# Patient Record
Sex: FEMALE | Race: WHITE | Employment: STUDENT | URBAN - METROPOLITAN AREA
[De-identification: names, ages, dates, MRNs, and addresses within clinical notes are randomized per-mention and may not be internally consistent; named-entity substitution may affect disease eponyms.]

---

## 2024-07-16 ENCOUNTER — OFFICE VISIT (OUTPATIENT)
Age: 19
End: 2024-07-16

## 2024-07-16 VITALS
HEART RATE: 106 BPM | HEIGHT: 65 IN | RESPIRATION RATE: 15 BRPM | SYSTOLIC BLOOD PRESSURE: 122 MMHG | WEIGHT: 190.8 LBS | OXYGEN SATURATION: 98 % | DIASTOLIC BLOOD PRESSURE: 72 MMHG | BODY MASS INDEX: 31.79 KG/M2

## 2024-07-16 DIAGNOSIS — Z11.4 SCREENING FOR HIV (HUMAN IMMUNODEFICIENCY VIRUS): ICD-10-CM

## 2024-07-16 DIAGNOSIS — L70.0 ACNE VULGARIS: ICD-10-CM

## 2024-07-16 DIAGNOSIS — Z13.6 ENCOUNTER FOR LIPID SCREENING FOR CARDIOVASCULAR DISEASE: ICD-10-CM

## 2024-07-16 DIAGNOSIS — Z11.59 NEED FOR HEPATITIS C SCREENING TEST: ICD-10-CM

## 2024-07-16 DIAGNOSIS — J30.9 ALLERGIC RHINITIS, UNSPECIFIED SEASONALITY, UNSPECIFIED TRIGGER: ICD-10-CM

## 2024-07-16 DIAGNOSIS — Z00.00 WELL ADULT EXAM: Primary | ICD-10-CM

## 2024-07-16 DIAGNOSIS — Z13.220 ENCOUNTER FOR LIPID SCREENING FOR CARDIOVASCULAR DISEASE: ICD-10-CM

## 2024-07-16 PROCEDURE — 99203 OFFICE O/P NEW LOW 30 MIN: CPT | Performed by: FAMILY MEDICINE

## 2024-07-16 PROCEDURE — 99395 PREV VISIT EST AGE 18-39: CPT | Performed by: FAMILY MEDICINE

## 2024-07-16 RX ORDER — FLUTICASONE PROPIONATE 50 MCG
1 SPRAY, SUSPENSION (ML) NASAL DAILY
Qty: 9.9 ML | Refills: 1 | Status: SHIPPED | OUTPATIENT
Start: 2024-07-16

## 2024-07-16 NOTE — PROGRESS NOTES
Adult Annual Physical  Name: Joann Padgett      : 2005      MRN: [id number]  Encounter Provider: INGRID Valverde  Encounter Date: 2024   Encounter department: [facility] PRIMARY CARE    Assessment & Plan   1. Well adult exam  2. Encounter for lipid screening for cardiovascular disease  3. Allergic rhinitis, unspecified seasonality, unspecified trigger  Assessment & Plan:  Flonase ordered  4. Acne vulgaris  Assessment & Plan:  Patient sees [facility]    Immunizations and preventive care screenings were discussed with patient today. Appropriate education was printed on patient's after visit summary.    Counseling:  Exercise: the importance of regular exercise/physical activity was discussed. Recommend exercise 3-5 times per week for at least 30 minutes.          History of Present Illness     Adult Annual Physical:  Patient presents for annual physical.     Diet and Physical Activity:  - Diet/Nutrition: well balanced diet.  - Exercise: walking.    Depression Screening:  - PHQ-2 Score: 0    General Health:  - Sleep: sleeps well.  - Hearing: normal hearing bilateral ears.  - Vision: wears glasses.  - Dental: regular dental visits.    /GYN Health:    - Last menstrual cycle: 2024.     Review of Systems   Constitutional:  Negative for chills and fever.   HENT:  Negative for ear pain and sore throat.    Eyes:  Negative for pain and visual disturbance.   Respiratory:  Negative for cough and shortness of breath.    Cardiovascular:  Negative for chest pain and palpitations.   Gastrointestinal:  Negative for abdominal pain and vomiting.   Genitourinary:  Negative for dysuria and hematuria.   Musculoskeletal:  Negative for arthralgias and back pain.   Skin:  Negative for color change and rash.   Neurological:  Negative for seizures and syncope.   All other systems reviewed and are negative.        Objective     /72 (BP Location: Left arm, Patient Position: Sitting, Cuff Size:  "Standard)   Pulse (!) 106   Resp 15   Ht 5' 5\" (1.651 m)   Wt 86.5 kg (190 lb 12.8 oz)   SpO2 98%   BMI 31.75 kg/m²     Physical Exam  Vitals and nursing note reviewed.   Constitutional:       General: She is not in acute distress.     Appearance: She is well-developed.   HENT:      Head: Normocephalic and atraumatic.      Nose:      Right Turbinates: Swollen.      Left Turbinates: Pale.      Mouth/Throat:      Pharynx: Posterior oropharyngeal erythema and postnasal drip present.   Eyes:      Conjunctiva/sclera: Conjunctivae normal.   Cardiovascular:      Rate and Rhythm: Normal rate and regular rhythm.      Heart sounds: No murmur heard.  Pulmonary:      Effort: Pulmonary effort is normal. No respiratory distress.      Breath sounds: Normal breath sounds.   Abdominal:      Palpations: Abdomen is soft.      Tenderness: There is no abdominal tenderness.   Musculoskeletal:         General: No swelling.      Cervical back: Neck supple.   Skin:     General: Skin is warm and dry.      Capillary Refill: Capillary refill takes less than 2 seconds.      Comments: Acne to face noted   Neurological:      Mental Status: She is alert.   Psychiatric:         Mood and Affect: Mood normal.         "

## 2024-08-13 ENCOUNTER — APPOINTMENT (OUTPATIENT)
Age: 19
End: 2024-08-13
Payer: COMMERCIAL

## 2024-08-13 DIAGNOSIS — Z13.220 ENCOUNTER FOR LIPID SCREENING FOR CARDIOVASCULAR DISEASE: ICD-10-CM

## 2024-08-13 DIAGNOSIS — L70.0 ACNE VULGARIS: ICD-10-CM

## 2024-08-13 DIAGNOSIS — Z00.00 WELL ADULT EXAM: ICD-10-CM

## 2024-08-13 DIAGNOSIS — Z11.4 SCREENING FOR HIV (HUMAN IMMUNODEFICIENCY VIRUS): ICD-10-CM

## 2024-08-13 DIAGNOSIS — Z13.6 ENCOUNTER FOR LIPID SCREENING FOR CARDIOVASCULAR DISEASE: ICD-10-CM

## 2024-08-13 LAB
ALBUMIN SERPL BCG-MCNC: 4.5 G/DL (ref 3.5–5)
ALP SERPL-CCNC: 76 U/L (ref 34–104)
ALT SERPL W P-5'-P-CCNC: 8 U/L (ref 7–52)
ANION GAP SERPL CALCULATED.3IONS-SCNC: 11 MMOL/L (ref 4–13)
AST SERPL W P-5'-P-CCNC: 14 U/L (ref 13–39)
BASOPHILS # BLD AUTO: 0.04 THOUSANDS/ÂΜL (ref 0–0.1)
BASOPHILS NFR BLD AUTO: 1 % (ref 0–1)
BILIRUB SERPL-MCNC: 0.65 MG/DL (ref 0.2–1)
BUN SERPL-MCNC: 11 MG/DL (ref 5–25)
CALCIUM SERPL-MCNC: 9.3 MG/DL (ref 8.4–10.2)
CHLORIDE SERPL-SCNC: 103 MMOL/L (ref 96–108)
CHOLEST SERPL-MCNC: 171 MG/DL
CO2 SERPL-SCNC: 27 MMOL/L (ref 21–32)
CREAT SERPL-MCNC: 0.72 MG/DL (ref 0.6–1.3)
EOSINOPHIL # BLD AUTO: 0.55 THOUSAND/ÂΜL (ref 0–0.61)
EOSINOPHIL NFR BLD AUTO: 8 % (ref 0–6)
ERYTHROCYTE [DISTWIDTH] IN BLOOD BY AUTOMATED COUNT: 13.4 % (ref 11.6–15.1)
GFR SERPL CREATININE-BSD FRML MDRD: 121 ML/MIN/1.73SQ M
GLUCOSE P FAST SERPL-MCNC: 86 MG/DL (ref 65–99)
HCT VFR BLD AUTO: 38.7 % (ref 34.8–46.1)
HDLC SERPL-MCNC: 59 MG/DL
HGB BLD-MCNC: 13.9 G/DL (ref 11.5–15.4)
IMM GRANULOCYTES # BLD AUTO: 0.01 THOUSAND/UL (ref 0–0.2)
IMM GRANULOCYTES NFR BLD AUTO: 0 % (ref 0–2)
INSULIN SERPL-ACNC: 4.48 UIU/ML (ref 1.9–23)
LDLC SERPL CALC-MCNC: 101 MG/DL (ref 0–100)
LYMPHOCYTES # BLD AUTO: 2.62 THOUSANDS/ÂΜL (ref 0.6–4.47)
LYMPHOCYTES NFR BLD AUTO: 39 % (ref 14–44)
MCH RBC QN AUTO: 30.8 PG (ref 26.8–34.3)
MCHC RBC AUTO-ENTMCNC: 35.9 G/DL (ref 31.4–37.4)
MCV RBC AUTO: 86 FL (ref 82–98)
MONOCYTES # BLD AUTO: 0.39 THOUSAND/ÂΜL (ref 0.17–1.22)
MONOCYTES NFR BLD AUTO: 6 % (ref 4–12)
NEUTROPHILS # BLD AUTO: 3.14 THOUSANDS/ÂΜL (ref 1.85–7.62)
NEUTS SEG NFR BLD AUTO: 46 % (ref 43–75)
NONHDLC SERPL-MCNC: 112 MG/DL
NRBC BLD AUTO-RTO: 0 /100 WBCS
PLATELET # BLD AUTO: 364 THOUSANDS/UL (ref 149–390)
PMV BLD AUTO: 9.8 FL (ref 8.9–12.7)
POTASSIUM SERPL-SCNC: 3.8 MMOL/L (ref 3.5–5.3)
PROLACTIN SERPL-MCNC: 14.21 NG/ML (ref 3.34–26.72)
PROT SERPL-MCNC: 7.5 G/DL (ref 6.4–8.4)
RBC # BLD AUTO: 4.52 MILLION/UL (ref 3.81–5.12)
SODIUM SERPL-SCNC: 141 MMOL/L (ref 135–147)
TRIGL SERPL-MCNC: 57 MG/DL
TSH SERPL DL<=0.05 MIU/L-ACNC: 2.52 UIU/ML (ref 0.45–4.5)
WBC # BLD AUTO: 6.75 THOUSAND/UL (ref 4.31–10.16)

## 2024-08-13 PROCEDURE — 87389 HIV-1 AG W/HIV-1&-2 AB AG IA: CPT

## 2024-08-13 PROCEDURE — 36415 COLL VENOUS BLD VENIPUNCTURE: CPT

## 2024-08-13 PROCEDURE — 84443 ASSAY THYROID STIM HORMONE: CPT

## 2024-08-13 PROCEDURE — 83525 ASSAY OF INSULIN: CPT

## 2024-08-14 LAB
HCV AB SER QL: NORMAL
HIV 1+2 AB+HIV1 P24 AG SERPL QL IA: NORMAL
HIV 2 AB SERPL QL IA: NORMAL
HIV1 AB SERPL QL IA: NORMAL
HIV1 P24 AG SERPL QL IA: NORMAL
TESTOST FREE SERPL-MCNC: 1.8 PG/ML
TESTOST SERPL-MCNC: 39 NG/DL (ref 13–71)

## 2024-08-15 PROBLEM — Z13.220 ENCOUNTER FOR LIPID SCREENING FOR CARDIOVASCULAR DISEASE: Status: RESOLVED | Noted: 2024-07-16 | Resolved: 2024-08-15

## 2024-08-15 PROBLEM — Z13.6 ENCOUNTER FOR LIPID SCREENING FOR CARDIOVASCULAR DISEASE: Status: RESOLVED | Noted: 2024-07-16 | Resolved: 2024-08-15

## 2024-08-18 LAB — 17OHP SERPL-MCNC: 45 NG/DL

## 2024-08-20 ENCOUNTER — TELEPHONE (OUTPATIENT)
Age: 19
End: 2024-08-20

## 2024-08-21 ENCOUNTER — OFFICE VISIT (OUTPATIENT)
Age: 19
End: 2024-08-21
Payer: COMMERCIAL

## 2024-08-21 VITALS
DIASTOLIC BLOOD PRESSURE: 72 MMHG | BODY MASS INDEX: 32.39 KG/M2 | SYSTOLIC BLOOD PRESSURE: 117 MMHG | HEART RATE: 118 BPM | WEIGHT: 194.4 LBS | HEIGHT: 65 IN | OXYGEN SATURATION: 97 %

## 2024-08-21 DIAGNOSIS — L70.0 ACNE VULGARIS: ICD-10-CM

## 2024-08-21 DIAGNOSIS — J30.9 ALLERGIC RHINITIS, UNSPECIFIED SEASONALITY, UNSPECIFIED TRIGGER: Primary | ICD-10-CM

## 2024-08-21 DIAGNOSIS — R00.0 TACHYCARDIA: ICD-10-CM

## 2024-08-21 PROCEDURE — 99213 OFFICE O/P EST LOW 20 MIN: CPT | Performed by: FAMILY MEDICINE

## 2024-08-21 PROCEDURE — 93000 ELECTROCARDIOGRAM COMPLETE: CPT | Performed by: FAMILY MEDICINE

## 2024-08-21 NOTE — PROGRESS NOTES
Ambulatory Visit  Name: Joann Padgett      : 2005      MRN: [id number]  Encounter Provider: INGRID Valverde  Encounter Date: 2024   Encounter department: [facility] PRIMARY CARE    Assessment & Plan   1. Allergic rhinitis, unspecified seasonality, unspecified trigger  Assessment & Plan:  Flonase ordered. Symptoms improved.  2. Acne vulgaris  Assessment & Plan:  Patient sees [facility]. Has an appointment in September  3. Tachycardia  Assessment & Plan:  Heart rate 118. Patient  states she walked here. EKG done - rate 113, sinus tachycardia, no axis deviation, no ischemia. Patient instructed to monitor her apple watch and report if her resting heart rate is above 100.  Orders:  -     POCT ECG         History of Present Illness       Joann Padgett is here for chronic conditions f/u. Pt. had labs done prior to today's visit which included Recent Results (from the past 672 hour(s))  -Lipid panel:   Collection Time: 24 12:03 PM       Result                      Value             Ref Range           Cholesterol                 171               See Comment *       Triglycerides               57                See Comment *       HDL, Direct                 59                >=50 mg/dL          LDL Calculated              101 (H)           0 - 100 mg/dL       Non-HDL-Chol (CHOL-HDL)     112               mg/dl          -Comprehensive metabolic panel:   Collection Time: 24 12:03 PM       Result                      Value             Ref Range           Sodium                      141               135 - 147 mm*       Potassium                   3.8               3.5 - 5.3 mm*       Chloride                    103               96 - 108 mmo*       CO2                         27                21 - 32 mmol*       ANION GAP                   11                4 - 13 mmol/L       BUN                         11                5 - 25 mg/dL        Creatinine                   0.72              0.60 - 1.30 *       Glucose, Fasting            86                65 - 99 mg/dL       Calcium                     9.3               8.4 - 10.2 m*       AST                         14                13 - 39 U/L         ALT                         8                 7 - 52 U/L          Alkaline Phosphatase        76                34 - 104 U/L        Total Protein               7.5               6.4 - 8.4 g/*       Albumin                     4.5               3.5 - 5.0 g/*       Total Bilirubin             0.65              0.20 - 1.00 *       eGFR                        121               ml/min/1.73s*  -CBC and differential:   Collection Time: 08/13/24 12:03 PM       Result                      Value             Ref Range           WBC                         6.75              4.31 - 10.16*       RBC                         4.52              3.81 - 5.12 *       Hemoglobin                  13.9              11.5 - 15.4 *       Hematocrit                  38.7              34.8 - 46.1 %       MCV                         86                82 - 98 fL          MCH                         30.8              26.8 - 34.3 *       MCHC                        35.9              31.4 - 37.4 *       RDW                         13.4              11.6 - 15.1 %       MPV                         9.8               8.9 - 12.7 fL       Platelets                   364               149 - 390 Th*       nRBC                        0                 /100 WBCs           Segmented %                 46                43 - 75 %           Immature Grans %            0                 0 - 2 %             Lymphocytes %               39                14 - 44 %           Monocytes %                 6                 4 - 12 %            Eosinophils Relative        8 (H)             0 - 6 %             Basophils Relative          1                 0 - 1 %             Absolute Neutrophils        3.14              1.85 - 7.62 *        Absolute Immature Grans     0.01              0.00 - 0.20 *       Absolute Lymphocytes        2.62              0.60 - 4.47 *       Absolute Monocytes          0.39              0.17 - 1.22 *       Eosinophils Absolute        0.55              0.00 - 0.61 *       Basophils Absolute          0.04              0.00 - 0.10 *  -Prolactin:   Collection Time: 08/13/24 12:03 PM       Result                      Value             Ref Range           Prolactin                   14.21             3.34 - 26.72*  -17-Hydroxyprogesterone:   Collection Time: 08/13/24 12:03 PM       Result                      Value             Ref Range           17-OH PROGESTERONE          45                ng/dL          -Testosterone, free, total:   Collection Time: 08/13/24 12:03 PM       Result                      Value             Ref Range           Testosterone, Free          1.8               Not Estab. p*       TESTOSTERONE TOTAL          39                13 - 71 ng/dL  -Hepatitis C Antibody:   Collection Time: 08/13/24 12:03 PM       Result                      Value             Ref Range           Hepatitis C Ab              Non-reactive      Non-Reactive   -Insulin, fasting:   Collection Time: 08/13/24 12:03 PM       Result                      Value             Ref Range           Insulin, Fasting            4.48              1.90 - 23.00*  -HIV 1/2 AG/AB w Reflex [facility] for 2 yr old and above:   Collection Time: 08/13/24 12:03 PM       Result                      Value             Ref Range           HIV-1 p24 Antigen           Non-Reactive      Non-Reactive        HIV-1 Antibody              Non-Reactive      Non-Reactive        HIV-2 Antibody              Non-Reactive      Non-Reactive        HIV Ag-Ab 5th Gen           Non-Reactive      Non-Reactive   -TSH, 3rd generation with Free T4 reflex:   Collection Time: 08/13/24 12:03 PM       Result                      Value             Ref Range           TSH 3RD GENERATON            "2.517             0.450 - 4.50*        Review of Systems   Constitutional:  Negative for chills and fever.   HENT:  Negative for ear pain and sore throat.    Eyes:  Negative for pain and visual disturbance.   Respiratory:  Negative for cough and shortness of breath.    Cardiovascular:  Negative for chest pain and palpitations.   Gastrointestinal:  Negative for abdominal pain and vomiting.   Genitourinary:  Negative for dysuria and hematuria.   Musculoskeletal:  Negative for arthralgias and back pain.   Skin:  Negative for color change and rash.   Neurological:  Negative for seizures and syncope.   All other systems reviewed and are negative.    History reviewed. No pertinent past medical history.  History reviewed. No pertinent surgical history.  History reviewed. No pertinent family history.  Social History     Tobacco Use    Smoking status: Never     Passive exposure: Never    Smokeless tobacco: Never   Vaping Use    Vaping status: Never Used   Substance and Sexual Activity    Alcohol use: Never    Drug use: Never    Sexual activity: Not on file     Current Outpatient Medications on File Prior to Visit   Medication Sig    fluticasone (FLONASE) 50 mcg/act nasal spray 1 spray into each nostril daily     No Known Allergies  Immunization History   Administered Date(s) Administered    HPV9 10/17/2019    Hepatitis A 10/17/2019    INFLUENZA 10/17/2019    MMR 10/17/2019    Meningococcal MCV4, Unspecified 10/17/2019     Objective     /72 (BP Location: Right arm, Patient Position: Sitting, Cuff Size: Standard)   Pulse (!) 118   Ht 5' 5\" (1.651 m)   Wt 88.2 kg (194 lb 6.4 oz)   SpO2 97%   BMI 32.35 kg/m²     Physical Exam  Vitals and nursing note reviewed.   Constitutional:       General: She is not in acute distress.     Appearance: She is well-developed. She is obese.   HENT:      Head: Normocephalic and atraumatic.   Eyes:      Conjunctiva/sclera: Conjunctivae normal.   Cardiovascular:      Rate and Rhythm: " Regular rhythm. Tachycardia present.      Heart sounds: No murmur heard.  Pulmonary:      Effort: Pulmonary effort is normal. No respiratory distress.      Breath sounds: Normal breath sounds.   Abdominal:      Palpations: Abdomen is soft.      Tenderness: There is no abdominal tenderness.   Musculoskeletal:         General: No swelling.      Cervical back: Neck supple.   Skin:     General: Skin is warm and dry.      Capillary Refill: Capillary refill takes less than 2 seconds.   Neurological:      Mental Status: She is alert.   Psychiatric:         Mood and Affect: Mood normal.

## 2024-08-21 NOTE — ASSESSMENT & PLAN NOTE
Heart rate 118. Patient  states she walked here. EKG done - rate 113, sinus tachycardia, no axis deviation, no ischemia. Patient instructed to monitor her apple watch and report if her resting heart rate is above 100.

## 2025-02-11 ENCOUNTER — OFFICE VISIT (OUTPATIENT)
Age: 20
End: 2025-02-11
Payer: COMMERCIAL

## 2025-02-11 VITALS
DIASTOLIC BLOOD PRESSURE: 70 MMHG | WEIGHT: 194.8 LBS | SYSTOLIC BLOOD PRESSURE: 122 MMHG | OXYGEN SATURATION: 99 % | HEIGHT: 65 IN | BODY MASS INDEX: 32.46 KG/M2 | HEART RATE: 99 BPM

## 2025-02-11 DIAGNOSIS — L70.0 ACNE VULGARIS: ICD-10-CM

## 2025-02-11 DIAGNOSIS — Z30.09 BIRTH CONTROL COUNSELING: ICD-10-CM

## 2025-02-11 DIAGNOSIS — R00.0 TACHYCARDIA: ICD-10-CM

## 2025-02-11 DIAGNOSIS — J30.9 ALLERGIC RHINITIS, UNSPECIFIED SEASONALITY, UNSPECIFIED TRIGGER: Primary | ICD-10-CM

## 2025-02-11 PROCEDURE — 99214 OFFICE O/P EST MOD 30 MIN: CPT | Performed by: FAMILY MEDICINE

## 2025-02-11 RX ORDER — SPIRONOLACTONE 50 MG/1
TABLET, FILM COATED ORAL
COMMUNITY
Start: 2025-01-09

## 2025-02-11 NOTE — PROGRESS NOTES
"Name: Joann Padgett      : 2005      MRN: [id number]  Encounter Provider: INGRID Valverde  Encounter Date: 2025   Encounter department: [facility]  :  Assessment & Plan  Allergic rhinitis, unspecified seasonality, unspecified trigger  Flonase ordered. Symptoms improved.         Acne vulgaris  Patient sees [facility]. Has an appointment in September         Tachycardia  Patient sees [facility]. Has an appointment in September         BMI 32.0-32.9,adult  Patient educated on healthy, balanced diet and exercise 30 minutes a day recommended         Birth control counseling    Orders:    Ambulatory Referral to Obstetrics / Gynecology; Future           History of Present Illness     Joann Padgett is here for chronic conditions f/u.      Review of Systems   Constitutional:  Negative for chills and fever.   HENT:  Negative for ear pain and sore throat.    Eyes:  Negative for pain and visual disturbance.   Respiratory:  Negative for cough and shortness of breath.    Cardiovascular:  Negative for chest pain and palpitations.   Gastrointestinal:  Negative for abdominal pain and vomiting.   Genitourinary:  Negative for dysuria and hematuria.   Musculoskeletal:  Negative for arthralgias and back pain.   Skin:  Negative for color change and rash.   Neurological:  Negative for seizures and syncope.   All other systems reviewed and are negative.      Objective   /70 (BP Location: Left arm, Patient Position: Sitting, Cuff Size: Standard)   Pulse (!) 113   Ht 5' 5\" (1.651 m)   Wt 88.4 kg (194 lb 12.8 oz)   SpO2 99%   BMI 32.42 kg/m²      Physical Exam  Vitals and nursing note reviewed.   Constitutional:       General: She is not in acute distress.     Appearance: She is well-developed. She is obese.   HENT:      Head: Normocephalic and atraumatic.   Eyes:      Conjunctiva/sclera: Conjunctivae normal.   Cardiovascular:      Rate and Rhythm: Normal rate and " regular rhythm.      Heart sounds: No murmur heard.  Pulmonary:      Effort: Pulmonary effort is normal. No respiratory distress.      Breath sounds: Normal breath sounds.   Abdominal:      Palpations: Abdomen is soft.      Tenderness: There is no abdominal tenderness.   Musculoskeletal:         General: No swelling.      Cervical back: Neck supple.   Skin:     General: Skin is warm and dry.      Capillary Refill: Capillary refill takes less than 2 seconds.   Neurological:      Mental Status: She is alert.   Psychiatric:         Mood and Affect: Mood normal.